# Patient Record
Sex: FEMALE | Race: WHITE
[De-identification: names, ages, dates, MRNs, and addresses within clinical notes are randomized per-mention and may not be internally consistent; named-entity substitution may affect disease eponyms.]

---

## 2018-03-09 ENCOUNTER — HOSPITAL ENCOUNTER (EMERGENCY)
Dept: HOSPITAL 24 - ER | Age: 23
Discharge: HOME | End: 2018-03-09
Payer: COMMERCIAL

## 2018-03-09 VITALS — BODY MASS INDEX: 24.9 KG/M2

## 2018-03-09 VITALS — DIASTOLIC BLOOD PRESSURE: 75 MMHG | SYSTOLIC BLOOD PRESSURE: 135 MMHG

## 2018-03-09 DIAGNOSIS — J02.0: ICD-10-CM

## 2018-03-09 DIAGNOSIS — H66.003: Primary | ICD-10-CM

## 2018-03-09 PROCEDURE — 96372 THER/PROPH/DIAG INJ SC/IM: CPT

## 2018-03-09 PROCEDURE — 87651 STREP A DNA AMP PROBE: CPT

## 2018-03-09 PROCEDURE — 99282 EMERGENCY DEPT VISIT SF MDM: CPT

## 2018-03-09 NOTE — DR.GENAD
HPI





- PCP


Primary Care Physician: [facility]





- HPI Comment


HPI Comment: GETTING WORSE.





- Complaint/Symptoms


Chief Complaint Doctors Comments: FEVER, BODY ACHES AND EAR ACHE AND SORE 

THROAT TIMES 3 DAYS.


Chief Complaint:: Patient c/o right ear throbbing, sore throat generalized body 

aches, and fever for several days. Patient states "temp has been 103. I have 

been taking aleve."


Self Treatment fo Chief Complaint: Aleve at 0530





- Nurses notes reviewed


Nurses Notes Review: Yes





- Source


History Provided: Patient





- Mode of Arrival


Mode of Arrival: Ambulatory





- Timing


Onset of Chief Complaint: 03/06/18


Came on: Suddenly





- Duration


Duration: Constant


Duration: Days





- Severity


Severity: Moderate





PMH





- PMH


Past Medical History: No


Past Surgical History: Yes


Past Surgical History Comment: tubal





- Family History


History of Family Medical Conditions: Yes


Family Medical History: Diabetes Mellitus, Hypertension





- Social History


Does patient currently use any type of tobacco product: No


Have you used tobacco products in the last 12 months: No


Type of Tobacco Use: None


Does any household member use tobacco: No


Alcohol Use: None


Do you use any recreational Drugs:: No


Lives With: Family


Lives Where: Home





- infectious screening


In the last 2 months have you had wt loss of >10#?: NO


Have you had fever, night sweats or hemotysis?: No


Have you traveled outside the country in the last 6 months?: No


Isolation: Standard





ROS





- Review of Systems


Constitutional: Fever, Weakness, Fatigue


Eyes: negative: Eye Pain, Discharge


ENTM: Ear Pain, Nose Discharge, Nose Congestion, Throat Pain


Respiratoy: Non-Productive Cough.  negative: Short of Breath, Wheezing, 

Hemoptysis


Cardiovascular: No Symptoms Reported


Gastrointestinal/Abdominal: No Symptoms Reported


Genitourinary: No Symptoms Reported


Neurological: No Symptoms Reported


Musculoskeletal: No Symptoms Reported, Muscle Pain


Integumentary: No Symptoms Reported


Hematologic/Lymphatic: No Symptoms Reported


Endocrine: No Symptoms Reported


All Other Systems: Reviewed and Negative





PE





- Vital Signs


Vitals: 


 





Temperature                      98.7 F


Pulse Rate                       108


Respiratory Rate                 17


Blood Pressure                   135/75


O2 Sat by Pulse Oximetry         100











- General


Limitations: No Limitations


General Appearance: Alert





- Head


Head Exam: Normal Inspection





- Eyes


Eye exam: Normal Appearance





- ENT


ENT Exam: negative: Normal Oropharynx (THROAT RED. TONSIL ENLARGE.), TM's 

Normal Bilaterally (TM INFLAME.)


External Ear Exam: Normal External Inspection


TM/Canal Exam: Bilateral Erythema, Bilateral Canal Tenderness


Nose Exam: Normal Nose Exam


Mouth Exam: Normal Inspection


Throat Exam: Tonsillar Erythema, Tonsillomegaly.  negative: Tonsillar Exudate





- Neck


Neck Exam: Trachea Midline





- Chest


Chest Inspection: Symmetric Chest Wall Rise





- Respiratory


Respiratory Exam: Normal Lung Sounds Bilat


Respiratory Exam: Bilateral Clear to Auscultation





- Cardiovascular


Cardiovascular Exam: Regular Rate, Normal Rhythm, Normal Heart Sounds





- Abdominal Exam


Abdominal Exam: Normal Inspection





- Extremities


Extremities Exam: Normal Inspection





- Back


Back Exam: Normal Inspection





- Neurologic


Neurological Exam: Alert, Oriented X3





- Psychiatric


Psychiatric Exam: Normal Affect, Normal Mood





- Skin


Skin Exam: Normal Color





MDM





- Differential Diagnosis


Differential Diagnosis: OTITIS MEDIS, STREP THROAT, BRONCHITIS, SINUSITIS





Course





- Treatment


Treatment: SEE ORDERS.





- Education/Counseling


Education/Counseling: Patient, Education


Educated On: Diagnosis, Needs for Follow Up





ROR





- Labs Reviewed


Laboratory Results Reviewed?: Yes





- Diagnosis


Discharge Problem: 


 Strep pharyngitis





Otitis media


Qualifiers:


 Otitis media type: suppurative Chronicity: acute Laterality: bilateral 

Recurrence: not specified as recurrent Spontaneous tympanic membrane rupture: 

without spontaneous rupture Qualified Code(s): H66.003 - Acute suppurative 

otitis media without spontaneous rupture of ear drum, bilateral








- Discharge Plan


Condition: Stable


Prescriptions: 


Azithromycin [ZITHROMAX Tab 250 mg *] 1 dose PO DAILY #6 tab


Ibuprofen [MOTRIN  MG *] 600 mg PO TID PRN #30 tab


 PRN Reason: Pain/Inflammation





- Follow ups/Referrals


Follow ups/Referrals: 


[facility],None [Primary Care Provider] - 3 days





- Instructions


Instructions:  Otitis Media, Adult, Easy-to-Read, Strep Throat, Easy-to-Read


Additional Instructions: 


RETURN TO ED IF WORSE.

## 2020-07-25 ENCOUNTER — TELEPHONE ENCOUNTER (OUTPATIENT)
Dept: URBAN - METROPOLITAN AREA CLINIC 13 | Facility: CLINIC | Age: 25
End: 2020-07-25

## 2020-07-26 ENCOUNTER — TELEPHONE ENCOUNTER (OUTPATIENT)
Dept: URBAN - METROPOLITAN AREA CLINIC 13 | Facility: CLINIC | Age: 25
End: 2020-07-26

## 2020-07-26 RX ORDER — PROGESTERONE 200 MG/1
CAPSULE, LIQUID FILLED ORAL
Qty: 30 | Refills: 0 | Status: ACTIVE | COMMUNITY
Start: 2013-04-05

## 2020-07-26 RX ORDER — METRONIDAZOLE 500 MG/1
TABLET ORAL
Qty: 14 | Refills: 0 | Status: ACTIVE | COMMUNITY
Start: 2012-10-09

## 2020-07-26 RX ORDER — AMOXICILLIN 875 MG/1
TABLET, FILM COATED ORAL
Qty: 20 | Refills: 0 | Status: ACTIVE | COMMUNITY
Start: 2012-09-26

## 2020-07-26 RX ORDER — PANTOPRAZOLE SODIUM 40 MG/1
TABLET, DELAYED RELEASE ORAL
Qty: 30 | Refills: 0 | Status: ACTIVE | COMMUNITY
Start: 2012-06-04

## 2020-07-26 RX ORDER — IBUPROFEN 800 MG/1
TABLET ORAL
Qty: 30 | Refills: 0 | Status: ACTIVE | COMMUNITY
Start: 2012-10-26

## 2020-07-26 RX ORDER — BUPROPION HYDROCHLORIDE 150 MG/1
TABLET, FILM COATED, EXTENDED RELEASE ORAL
Qty: 30 | Refills: 0 | Status: ACTIVE | COMMUNITY
Start: 2012-05-08

## 2020-07-26 RX ORDER — NORGESTIMATE AND ETHINYL ESTRADIOL
KIT
Qty: 28 | Refills: 0 | Status: ACTIVE | COMMUNITY
Start: 2012-10-09